# Patient Record
(demographics unavailable — no encounter records)

---

## 2022-08-01 RX ORDER — DILTIAZEM HYDROCHLORIDE 60 MG/1
TABLET, FILM COATED ORAL
Qty: 10.2 G | Refills: 23 | OUTPATIENT
Start: 2022-08-01

## 2022-10-18 RX ORDER — MECLIZINE HYDROCHLORIDE 25 MG/1
TABLET ORAL
Qty: 60 TABLET | Refills: 10 | OUTPATIENT
Start: 2022-10-18

## 2022-10-18 NOTE — TELEPHONE ENCOUNTER
VM left with pt to discuss as this is the second time a refill request was requested from our office which pt has never been seen.